# Patient Record
Sex: MALE | Race: WHITE
[De-identification: names, ages, dates, MRNs, and addresses within clinical notes are randomized per-mention and may not be internally consistent; named-entity substitution may affect disease eponyms.]

---

## 2017-03-06 ENCOUNTER — MYAURORA ACCOUNT LINK (OUTPATIENT)
Dept: OTHER | Age: 82
End: 2017-03-06

## 2017-03-06 ENCOUNTER — PRIOR ORIGINAL RECORDS (OUTPATIENT)
Dept: OTHER | Age: 82
End: 2017-03-06

## 2017-03-17 ENCOUNTER — TELEPHONE (OUTPATIENT)
Dept: HEMATOLOGY/ONCOLOGY | Facility: HOSPITAL | Age: 82
End: 2017-03-17

## 2017-03-17 NOTE — TELEPHONE ENCOUNTER
LM with patient reminding him to have his labs done prior to his follow up with Dr Randall Rockwell which is scheduled for 3/20.

## 2017-03-22 ENCOUNTER — OFFICE VISIT (OUTPATIENT)
Dept: HEMATOLOGY/ONCOLOGY | Facility: HOSPITAL | Age: 82
End: 2017-03-22
Attending: INTERNAL MEDICINE
Payer: MEDICARE

## 2017-03-22 VITALS
SYSTOLIC BLOOD PRESSURE: 141 MMHG | HEART RATE: 60 BPM | TEMPERATURE: 98 F | BODY MASS INDEX: 26.37 KG/M2 | DIASTOLIC BLOOD PRESSURE: 86 MMHG | HEIGHT: 67 IN | WEIGHT: 168 LBS | RESPIRATION RATE: 18 BRPM

## 2017-03-22 DIAGNOSIS — I25.5 ISCHEMIC CARDIOMYOPATHY: ICD-10-CM

## 2017-03-22 DIAGNOSIS — D64.9 ANEMIA, UNSPECIFIED TYPE: ICD-10-CM

## 2017-03-22 DIAGNOSIS — D69.6 THROMBOCYTOPENIA (HCC): Primary | ICD-10-CM

## 2017-03-22 LAB
BASOPHILS # BLD: 0 K/UL (ref 0–0.2)
BASOPHILS NFR BLD: 0 %
EOSINOPHIL # BLD: 0.1 K/UL (ref 0–0.7)
EOSINOPHIL NFR BLD: 1 %
ERYTHROCYTE [DISTWIDTH] IN BLOOD BY AUTOMATED COUNT: 14.3 % (ref 11–15)
HCT VFR BLD AUTO: 36.6 % (ref 41–52)
HGB BLD-MCNC: 12.5 G/DL (ref 13.5–17.5)
LDH SERPL L TO P-CCNC: 241 U/L (ref 98–192)
LYMPHOCYTES # BLD: 1 K/UL (ref 1–4)
LYMPHOCYTES NFR BLD: 16 %
MCH RBC QN AUTO: 30.8 PG (ref 27–32)
MCHC RBC AUTO-ENTMCNC: 34.1 G/DL (ref 32–37)
MCV RBC AUTO: 90.3 FL (ref 80–100)
MONOCYTES # BLD: 0.6 K/UL (ref 0–1)
MONOCYTES NFR BLD: 9 %
NEUTROPHILS # BLD AUTO: 4.5 K/UL (ref 1.8–7.7)
NEUTROPHILS NFR BLD: 73 %
PLATELET # BLD AUTO: 99 K/UL (ref 140–400)
PMV BLD AUTO: 10.4 FL (ref 7.4–10.3)
RBC # BLD AUTO: 4.05 M/UL (ref 4.5–5.9)
WBC # BLD AUTO: 6.1 K/UL (ref 4–11)

## 2017-03-22 PROCEDURE — G0463 HOSPITAL OUTPT CLINIC VISIT: HCPCS | Performed by: INTERNAL MEDICINE

## 2017-03-22 PROCEDURE — 99214 OFFICE O/P EST MOD 30 MIN: CPT | Performed by: INTERNAL MEDICINE

## 2017-03-22 NOTE — PROGRESS NOTES
Cancer Center Progress Note    Patient Name: Armando Christiansen   YOB: 1924   Medical Record Number: [id number]   Attending Physician: Kala Link M.D.        Chief Complaint:  thrombocytopenia    History of Present Illness:  80year-old male w tablet by mouth daily. , Disp: , Rfl:   •  carvedilol 6.25 MG Oral Tab, Take 6.25 mg by mouth 2 (two) times daily with meals.   , Disp: , Rfl: 0  •  simvastatin 80 MG Oral Tab, Take 40 mg by mouth nightly.  , Disp: , Rfl: 0  •  tamsulosin HCl 0.4 MG Oral Cap RDW 13.8 11/14/2016   WBC 6.1 11/14/2016   PLT 81* 11/14/2016     CMP: No results for input(s): GLU, BUN, CREATSERUM, GFRAA, GFRNAA, CA, ALB, NA, K, CL, CO2, ALKPHO, AST, ALT, BILT, TP in the last 72 hours.            Radiology:  none    No matching stagi

## 2017-03-23 ENCOUNTER — APPOINTMENT (OUTPATIENT)
Dept: HEMATOLOGY/ONCOLOGY | Facility: HOSPITAL | Age: 82
End: 2017-03-23
Attending: INTERNAL MEDICINE
Payer: MEDICARE

## 2017-04-04 ENCOUNTER — PRIOR ORIGINAL RECORDS (OUTPATIENT)
Dept: OTHER | Age: 82
End: 2017-04-04

## 2017-04-04 ENCOUNTER — LAB REQUISITION (OUTPATIENT)
Dept: LAB | Facility: HOSPITAL | Age: 82
End: 2017-04-04
Payer: MEDICARE

## 2017-04-04 DIAGNOSIS — N18.30 CHRONIC KIDNEY DISEASE, STAGE III (MODERATE) (HCC): ICD-10-CM

## 2017-04-04 DIAGNOSIS — E78.00 PURE HYPERCHOLESTEROLEMIA: ICD-10-CM

## 2017-04-04 DIAGNOSIS — E11.9 TYPE 2 DIABETES MELLITUS WITHOUT COMPLICATIONS (HCC): ICD-10-CM

## 2017-04-04 DIAGNOSIS — D69.6 THROMBOCYTOPENIA (HCC): ICD-10-CM

## 2017-04-04 PROCEDURE — 84460 ALANINE AMINO (ALT) (SGPT): CPT | Performed by: INTERNAL MEDICINE

## 2017-04-04 PROCEDURE — 80061 LIPID PANEL: CPT | Performed by: INTERNAL MEDICINE

## 2017-04-04 PROCEDURE — 80048 BASIC METABOLIC PNL TOTAL CA: CPT | Performed by: INTERNAL MEDICINE

## 2017-04-04 PROCEDURE — 83036 HEMOGLOBIN GLYCOSYLATED A1C: CPT | Performed by: INTERNAL MEDICINE

## 2017-04-04 PROCEDURE — 85025 COMPLETE CBC W/AUTO DIFF WBC: CPT | Performed by: INTERNAL MEDICINE

## 2017-06-16 LAB
ALT (SGPT): 18 U/L
BUN: 21 MG/DL
CALCIUM: 9.4 MG/DL
CHLORIDE: 108 MEQ/L
CHOLESTEROL, TOTAL: 152 MG/DL
CREATININE, SERUM: 1.23 MG/DL
GLUCOSE: 129 MG/DL
GLUCOSE: 129 MG/DL
HDL CHOLESTEROL: 41 MG/DL
HEMATOCRIT: 37.6 %
HEMOGLOBIN: 12.7 G/DL
LDL CHOLESTEROL: 95 MG/DL
MCH: 30.9 PG
MCHC: 33.8 G/DL
MCV: 91.5 FL
NON-HDL CHOLESTEROL: 111 MG/DL
PLATELETS: 104 K/UL
POTASSIUM, SERUM: 4.7 MEQ/L
RED BLOOD COUNT: 4.11 X 10-6/U
SGPT (ALT): 18 IU/L
SODIUM: 142 MEQ/L
TRIGLYCERIDES: 80 MG/DL
WHITE BLOOD COUNT: 5.7 X 10-3/U

## 2017-06-20 ENCOUNTER — MYAURORA ACCOUNT LINK (OUTPATIENT)
Dept: OTHER | Age: 82
End: 2017-06-20

## 2017-07-28 ENCOUNTER — PRIOR ORIGINAL RECORDS (OUTPATIENT)
Dept: OTHER | Age: 82
End: 2017-07-28

## 2017-09-19 ENCOUNTER — NURSE ONLY (OUTPATIENT)
Dept: HEMATOLOGY/ONCOLOGY | Facility: HOSPITAL | Age: 82
End: 2017-09-19
Attending: INTERNAL MEDICINE
Payer: MEDICARE

## 2017-09-19 ENCOUNTER — TELEPHONE (OUTPATIENT)
Dept: HEMATOLOGY/ONCOLOGY | Facility: HOSPITAL | Age: 82
End: 2017-09-19

## 2017-09-19 VITALS
SYSTOLIC BLOOD PRESSURE: 147 MMHG | TEMPERATURE: 98 F | HEART RATE: 60 BPM | DIASTOLIC BLOOD PRESSURE: 52 MMHG | BODY MASS INDEX: 25.11 KG/M2 | RESPIRATION RATE: 18 BRPM | WEIGHT: 160 LBS | HEIGHT: 67 IN

## 2017-09-19 DIAGNOSIS — D69.6 THROMBOCYTOPENIA (HCC): Primary | ICD-10-CM

## 2017-09-19 DIAGNOSIS — D69.6 THROMBOCYTOPENIA (HCC): ICD-10-CM

## 2017-09-19 DIAGNOSIS — D64.9 ANEMIA, UNSPECIFIED TYPE: ICD-10-CM

## 2017-09-19 LAB
BASOPHILS # BLD: 0 K/UL (ref 0–0.2)
BASOPHILS NFR BLD: 1 %
EOSINOPHIL # BLD: 0.1 K/UL (ref 0–0.7)
EOSINOPHIL NFR BLD: 2 %
ERYTHROCYTE [DISTWIDTH] IN BLOOD BY AUTOMATED COUNT: 14.2 % (ref 11–15)
HCT VFR BLD AUTO: 38.4 % (ref 41–52)
HGB BLD-MCNC: 13.3 G/DL (ref 13.5–17.5)
LYMPHOCYTES # BLD: 1 K/UL (ref 1–4)
LYMPHOCYTES NFR BLD: 17 %
MCH RBC QN AUTO: 31.4 PG (ref 27–32)
MCHC RBC AUTO-ENTMCNC: 34.7 G/DL (ref 32–37)
MCV RBC AUTO: 90.5 FL (ref 80–100)
MONOCYTES # BLD: 0.6 K/UL (ref 0–1)
MONOCYTES NFR BLD: 10 %
NEUTROPHILS # BLD AUTO: 3.9 K/UL (ref 1.8–7.7)
NEUTROPHILS NFR BLD: 70 %
PLATELET # BLD AUTO: 97 K/UL (ref 140–400)
PMV BLD AUTO: 10.1 FL (ref 7.4–10.3)
RBC # BLD AUTO: 4.24 M/UL (ref 4.5–5.9)
WBC # BLD AUTO: 5.6 K/UL (ref 4–11)

## 2017-09-19 PROCEDURE — 85025 COMPLETE CBC W/AUTO DIFF WBC: CPT

## 2017-09-19 PROCEDURE — 99213 OFFICE O/P EST LOW 20 MIN: CPT | Performed by: INTERNAL MEDICINE

## 2017-09-19 PROCEDURE — G0463 HOSPITAL OUTPT CLINIC VISIT: HCPCS | Performed by: INTERNAL MEDICINE

## 2017-09-19 PROCEDURE — 36415 COLL VENOUS BLD VENIPUNCTURE: CPT

## 2017-09-19 NOTE — PROGRESS NOTES
patient to center for fast track labs prior to MD visit  Labs drawn peripherally from RT FA 2 x 2 and coban to site  Patient escorted to waiting area, he will see Dr. Cresencio Méndez today  , patient states he did not eat breakfast, he denies any dizziness juice and

## 2017-09-19 NOTE — PROGRESS NOTES
Cancer Center Progress Note    Patient Name: Zachary Kendrick   YOB: 1924   Medical Record Number: [id number]   Attending Physician: Terrence Tovar M.D.        Chief Complaint:  thrombocytopenia    History of Present Illness:  80year-old male w •  multivitamin Oral Tab, Take 1 tablet by mouth daily. , Disp: , Rfl:   •  carvedilol 6.25 MG Oral Tab, Take 6.25 mg by mouth 2 (two) times daily with meals.   , Disp: , Rfl: 0  •  simvastatin 80 MG Oral Tab, Take 40 mg by mouth nightly.  , Disp: , Rfl: 0 11/14/2016   MCV 91.4 11/14/2016   MCH 31.3 11/14/2016   MCHC 34.3 11/14/2016   RDW 13.8 11/14/2016   WBC 6.1 11/14/2016   PLT 81* 11/14/2016     CMP: No results for input(s): GLU, BUN, CREATSERUM, GFRAA, GFRNAA, CA, ALB, NA, K, CL, CO2, ALKPHO, AST, ALT,

## 2017-10-10 ENCOUNTER — LAB REQUISITION (OUTPATIENT)
Dept: LAB | Facility: HOSPITAL | Age: 82
End: 2017-10-10
Payer: MEDICARE

## 2017-10-10 DIAGNOSIS — E11.9 TYPE 2 DIABETES MELLITUS WITHOUT COMPLICATIONS (HCC): ICD-10-CM

## 2017-10-10 DIAGNOSIS — E78.00 PURE HYPERCHOLESTEROLEMIA: ICD-10-CM

## 2017-10-10 PROCEDURE — 80053 COMPREHEN METABOLIC PANEL: CPT | Performed by: INTERNAL MEDICINE

## 2017-10-10 PROCEDURE — 84443 ASSAY THYROID STIM HORMONE: CPT | Performed by: INTERNAL MEDICINE

## 2017-10-10 PROCEDURE — 85025 COMPLETE CBC W/AUTO DIFF WBC: CPT | Performed by: INTERNAL MEDICINE

## 2017-10-10 PROCEDURE — 83036 HEMOGLOBIN GLYCOSYLATED A1C: CPT | Performed by: INTERNAL MEDICINE

## 2017-10-10 PROCEDURE — 80061 LIPID PANEL: CPT | Performed by: INTERNAL MEDICINE

## 2017-10-13 ENCOUNTER — LAB REQUISITION (OUTPATIENT)
Dept: LAB | Facility: HOSPITAL | Age: 82
End: 2017-10-13
Payer: MEDICARE

## 2017-10-13 DIAGNOSIS — N18.30 CHRONIC KIDNEY DISEASE, STAGE III (MODERATE) (HCC): ICD-10-CM

## 2017-10-13 DIAGNOSIS — R41.3 OTHER AMNESIA: ICD-10-CM

## 2017-10-13 PROCEDURE — 82043 UR ALBUMIN QUANTITATIVE: CPT | Performed by: INTERNAL MEDICINE

## 2017-10-13 PROCEDURE — 82607 VITAMIN B-12: CPT | Performed by: INTERNAL MEDICINE

## 2017-10-13 PROCEDURE — 82570 ASSAY OF URINE CREATININE: CPT | Performed by: INTERNAL MEDICINE

## 2017-10-13 PROCEDURE — 81003 URINALYSIS AUTO W/O SCOPE: CPT | Performed by: INTERNAL MEDICINE

## 2017-11-14 ENCOUNTER — HOSPITAL ENCOUNTER (EMERGENCY)
Facility: HOSPITAL | Age: 82
Discharge: HOME | End: 2017-11-14
Attending: EMERGENCY MEDICINE
Payer: MEDICARE

## 2017-11-14 VITALS
TEMPERATURE: 98 F | SYSTOLIC BLOOD PRESSURE: 155 MMHG | OXYGEN SATURATION: 97 % | HEART RATE: 60 BPM | RESPIRATION RATE: 13 BRPM | DIASTOLIC BLOOD PRESSURE: 60 MMHG

## 2017-11-14 DIAGNOSIS — R42 DIZZINESS: Primary | ICD-10-CM

## 2017-11-14 PROCEDURE — 80048 BASIC METABOLIC PNL TOTAL CA: CPT | Performed by: EMERGENCY MEDICINE

## 2017-11-14 PROCEDURE — 85025 COMPLETE CBC W/AUTO DIFF WBC: CPT | Performed by: EMERGENCY MEDICINE

## 2017-11-14 PROCEDURE — 93010 ELECTROCARDIOGRAM REPORT: CPT | Performed by: EMERGENCY MEDICINE

## 2017-11-14 PROCEDURE — 93005 ELECTROCARDIOGRAM TRACING: CPT

## 2017-11-14 PROCEDURE — 36415 COLL VENOUS BLD VENIPUNCTURE: CPT

## 2017-11-14 PROCEDURE — 99284 EMERGENCY DEPT VISIT MOD MDM: CPT

## 2017-11-14 NOTE — ED NOTES
Spoke to VERENICE, arranged wheelchair transport home via [employer]. ETA 60-90 minutes.

## 2017-11-14 NOTE — ED NOTES
Food tray delivered. Pt eating dinner sitting on the edge of the bed. No signs or symptoms of distress.

## 2017-11-15 NOTE — ED PROVIDER NOTES
Patient Seen in: [facility] AND [facility] Emergency Department    History   Patient presents with:  Dizziness (neurologic)      HPI    The patient presents via EMS after feeling dizzy while standing up at his care facility earlier today.   Had slight nausea foll Constitutional: He is oriented to person, place, and time. He appears well-developed and well-nourished. No distress. HENT:   Head: Normocephalic and atraumatic. Eyes: Conjunctivae are normal. Right eye exhibits no discharge.  Left eye exhibits no dis while in ED:     ED Medications Administered: Medications - No data to display      [facility]      11/14/17  1526 11/14/17  1600   BP: (!) 162/59 155/60   Pulse: 60 60   Resp: 17 13   Temp: 98.3 °F (36.8 °C)    TempSrc: Temporal    SpO2: 99% 97%     *I personall

## 2017-11-21 ENCOUNTER — MYAURORA ACCOUNT LINK (OUTPATIENT)
Dept: OTHER | Age: 82
End: 2017-11-21

## 2017-11-28 ENCOUNTER — HOSPITAL ENCOUNTER (OUTPATIENT)
Facility: HOSPITAL | Age: 82
Setting detail: OBSERVATION
LOS: 1 days | Discharge: NURSING, REHAB OR LONG-TERM CARE | End: 2017-11-30
Attending: EMERGENCY MEDICINE | Admitting: INTERNAL MEDICINE
Payer: MEDICARE

## 2017-11-28 ENCOUNTER — PRIOR ORIGINAL RECORDS (OUTPATIENT)
Dept: OTHER | Age: 82
End: 2017-11-28

## 2017-11-28 ENCOUNTER — APPOINTMENT (OUTPATIENT)
Dept: GENERAL RADIOLOGY | Facility: HOSPITAL | Age: 82
End: 2017-11-28
Attending: EMERGENCY MEDICINE
Payer: MEDICARE

## 2017-11-28 ENCOUNTER — APPOINTMENT (OUTPATIENT)
Dept: CT IMAGING | Facility: HOSPITAL | Age: 82
End: 2017-11-28
Attending: EMERGENCY MEDICINE
Payer: MEDICARE

## 2017-11-28 DIAGNOSIS — S22.41XA CLOSED FRACTURE OF MULTIPLE RIBS OF RIGHT SIDE, INITIAL ENCOUNTER: Primary | ICD-10-CM

## 2017-11-28 DIAGNOSIS — R07.9 ACUTE CHEST PAIN: ICD-10-CM

## 2017-11-28 DIAGNOSIS — E86.0 DEHYDRATION: ICD-10-CM

## 2017-11-28 DIAGNOSIS — R41.0 DELIRIUM: ICD-10-CM

## 2017-11-28 PROBLEM — R73.9 HYPERGLYCEMIA: Status: ACTIVE | Noted: 2017-11-28

## 2017-11-28 PROCEDURE — 70450 CT HEAD/BRAIN W/O DYE: CPT | Performed by: EMERGENCY MEDICINE

## 2017-11-28 PROCEDURE — 71101 X-RAY EXAM UNILAT RIBS/CHEST: CPT | Performed by: EMERGENCY MEDICINE

## 2017-11-28 RX ORDER — TIMOLOL MALEATE 5 MG/ML
1 SOLUTION OPHTHALMIC DAILY
Status: DISCONTINUED | OUTPATIENT
Start: 2017-11-28 | End: 2017-11-29

## 2017-11-28 RX ORDER — WARFARIN SODIUM 5 MG/1
5 TABLET ORAL NIGHTLY
Status: DISCONTINUED | OUTPATIENT
Start: 2017-11-28 | End: 2017-11-30

## 2017-11-28 RX ORDER — CARVEDILOL 6.25 MG/1
6.25 TABLET ORAL 2 TIMES DAILY WITH MEALS
Status: DISCONTINUED | OUTPATIENT
Start: 2017-11-28 | End: 2017-11-30

## 2017-11-28 RX ORDER — DONEPEZIL HYDROCHLORIDE 5 MG/1
5 TABLET, FILM COATED ORAL NIGHTLY
COMMUNITY

## 2017-11-28 RX ORDER — POLYETHYLENE GLYCOL 3350 17 G/17G
17 POWDER, FOR SOLUTION ORAL DAILY PRN
Status: DISCONTINUED | OUTPATIENT
Start: 2017-11-28 | End: 2017-11-30

## 2017-11-28 RX ORDER — ALFUZOSIN HYDROCHLORIDE 10 MG/1
10 TABLET, EXTENDED RELEASE ORAL
Status: DISCONTINUED | OUTPATIENT
Start: 2017-11-29 | End: 2017-11-30

## 2017-11-28 RX ORDER — SODIUM CHLORIDE 9 MG/ML
INJECTION, SOLUTION INTRAVENOUS CONTINUOUS
Status: ACTIVE | OUTPATIENT
Start: 2017-11-28 | End: 2017-11-28

## 2017-11-28 RX ORDER — PRAVASTATIN SODIUM 20 MG
20 TABLET ORAL NIGHTLY
Status: DISCONTINUED | OUTPATIENT
Start: 2017-11-28 | End: 2017-11-30

## 2017-11-28 RX ORDER — BISACODYL 10 MG
10 SUPPOSITORY, RECTAL RECTAL
Status: DISCONTINUED | OUTPATIENT
Start: 2017-11-28 | End: 2017-11-30

## 2017-11-28 RX ORDER — DEXTROSE MONOHYDRATE 25 G/50ML
50 INJECTION, SOLUTION INTRAVENOUS AS NEEDED
Status: DISCONTINUED | OUTPATIENT
Start: 2017-11-28 | End: 2017-11-30

## 2017-11-28 RX ORDER — SODIUM CHLORIDE 0.9 % (FLUSH) 0.9 %
3 SYRINGE (ML) INJECTION AS NEEDED
Status: DISCONTINUED | OUTPATIENT
Start: 2017-11-28 | End: 2017-11-30

## 2017-11-28 RX ORDER — ONDANSETRON 2 MG/ML
4 INJECTION INTRAMUSCULAR; INTRAVENOUS EVERY 6 HOURS PRN
Status: DISCONTINUED | OUTPATIENT
Start: 2017-11-28 | End: 2017-11-30

## 2017-11-28 RX ORDER — ASPIRIN 81 MG/1
81 TABLET, CHEWABLE ORAL DAILY
Status: DISCONTINUED | OUTPATIENT
Start: 2017-11-29 | End: 2017-11-30

## 2017-11-28 RX ORDER — HEPARIN SODIUM 5000 [USP'U]/ML
5000 INJECTION, SOLUTION INTRAVENOUS; SUBCUTANEOUS EVERY 12 HOURS SCHEDULED
Status: DISCONTINUED | OUTPATIENT
Start: 2017-11-28 | End: 2017-11-30

## 2017-11-28 RX ORDER — DOCUSATE SODIUM 100 MG/1
100 CAPSULE, LIQUID FILLED ORAL 2 TIMES DAILY
Status: DISCONTINUED | OUTPATIENT
Start: 2017-11-28 | End: 2017-11-30

## 2017-11-28 RX ORDER — DONEPEZIL HYDROCHLORIDE 5 MG/1
5 TABLET, FILM COATED ORAL NIGHTLY
Status: DISCONTINUED | OUTPATIENT
Start: 2017-11-28 | End: 2017-11-30

## 2017-11-28 RX ORDER — SODIUM PHOSPHATE, DIBASIC AND SODIUM PHOSPHATE, MONOBASIC 7; 19 G/133ML; G/133ML
1 ENEMA RECTAL ONCE AS NEEDED
Status: DISCONTINUED | OUTPATIENT
Start: 2017-11-28 | End: 2017-11-30

## 2017-11-28 RX ORDER — DOXEPIN HYDROCHLORIDE 50 MG/1
1 CAPSULE ORAL DAILY
Status: DISCONTINUED | OUTPATIENT
Start: 2017-11-29 | End: 2017-11-30

## 2017-11-28 RX ORDER — LOSARTAN POTASSIUM 50 MG/1
50 TABLET ORAL DAILY
Status: DISCONTINUED | OUTPATIENT
Start: 2017-11-29 | End: 2017-11-30

## 2017-11-28 NOTE — ED NOTES
Patient taken off bed pan, walked to bathroom, states he had to pee, did not pee or have BM, ambulated to bed with assistance

## 2017-11-28 NOTE — ED INITIAL ASSESSMENT (HPI)
Per ems patient fell today, witness by nurse at [facility], states patient has been more confused \"lately\", patient states the fall was yesterday, complains of chest and back pain, denies head/neck pain/loc, a&ox3

## 2017-11-29 ENCOUNTER — APPOINTMENT (OUTPATIENT)
Dept: GENERAL RADIOLOGY | Facility: HOSPITAL | Age: 82
End: 2017-11-29
Attending: INTERNAL MEDICINE
Payer: MEDICARE

## 2017-11-29 PROCEDURE — 71020 XR CHEST PA + LAT CHEST (CPT=71020): CPT | Performed by: INTERNAL MEDICINE

## 2017-11-29 PROCEDURE — 72080 X-RAY EXAM THORACOLMB 2/> VW: CPT | Performed by: INTERNAL MEDICINE

## 2017-11-29 PROCEDURE — 99204 OFFICE O/P NEW MOD 45 MIN: CPT | Performed by: OTHER

## 2017-11-29 RX ORDER — TIMOLOL MALEATE 5 MG/ML
1 SOLUTION/ DROPS OPHTHALMIC 2 TIMES DAILY
Status: DISCONTINUED | OUTPATIENT
Start: 2017-11-29 | End: 2017-11-30

## 2017-11-29 NOTE — CONSULTS
[facility] HOSP - [facility]    Report of Consultation    Efrain Blue Grass Patient Status:  Inpatient    3/22/1924 MRN [id number]   Location [facility] 3W/SW Attending Livia Marcial MD   Hosp Day # 1 PCP Crystal Colin MD     Date of Admission: mg Oral Daily   multivitamin (ADULT) tab 1 tablet 1 tablet Oral Daily   Pravastatin Sodium (PRAVACHOL) tab 20 mg 20 mg Oral Nightly   Alfuzosin HCl ER (UROXATRAL) 24 hr tab 10 mg 10 mg Oral Daily with breakfast   Warfarin Sodium (COUMADIN) tab 5 mg 5 mg Or Losartan Potassium (COZAAR) 50 MG Oral Tab Take 1 tablet by mouth daily.        Allergies  No Known Allergies    Review of Systems:   As in HPI, the rest of the 14 system review was done and was negative    Physical Exam:      11/28/17 1958 11/29/17  050 sign    Coordination:  Finger to nose intact  Rapid alternating movements intact    Gait:  Unable to test    Results:     Laboratory Data:    Lab Results  Component Value Date   WBC 10.2 11/28/2017   HGB 13.0 (L) 11/28/2017   HCT 38.0 (L) 11/28/2017   PLT

## 2017-11-29 NOTE — CONSULTS
[facility] - [facility]    Report of Consultation    Emelina Tierney Patient Status:  Inpatient    3/22/1924 MRN [id number]   Location [facility] 3W/SW Attending Danette Crump MD   Hosp Day # 1 PCP Candis Mccurdy MD     Date of Admission: carvedilol 6.25 MG Oral Tab Take 6.25 mg by mouth 2 (two) times daily with meals. simvastatin 80 MG Oral Tab Take 40 mg by mouth nightly. tamsulosin HCl 0.4 MG Oral Cap Take 0.4 mg by mouth daily.      Warfarin Sodium 5 MG Oral Tab Take 5 mg by mo 11/28/2017   B12 560 10/13/2017     Xr Chest Pa + Lat Chest (pin=[id number])    Result Date: 11/29/2017  CONCLUSION:  1. Cardiomegaly. Postop cardiac surgery changes. 2. Vascular congestion with mild diffuse interstitial edema.  Findings consistent with acute ex anticoagulation  Coumadin has been restarted by PCP  Awaiting physical therapy evaluation to determine safety of gait and fall risk  Interrogation of ICD to evaluate frequency of atrial fibrillation and to r/o any recent events     Hyperglycemia  Her PCP

## 2017-11-29 NOTE — HISTORICAL OFFICE NOTE
Equilla Smoker  : 1924  ACCOUNT:  [id number]  [phone]  PCP: Dr. Conor Paulino    TODAY'S DATE: 2017  DICTATED BY:  Jonathan Cali M.D.]    CHIEF COMPLAINT: [Followup of Hypercholesteremia, pure, Followup of ICD, not pacemaker dependent, Asya Amyck CAFFEINE: rare. ALCOHOL: socially. EXERCISE: walks. DIET: no special diet. MARITAL STATUS: . OCCUPATION: dentist and retired.     ALLERGIES: No Known Allergies    MEDICATIONS: Selected prescriptions see below    VITAL SIGNS: [B/P - 138/62 , Pulse - 6 80MG      1/2 tab at bedtime  12/19/16 Vitamin D3            2000UNIT  1cap 3-4 times a week  12/19/16 Vitamin E             400UNIT   1cap a week  12/03/15 Glucosamine 1500 Compl          1 daily  12/03/15 Hydrochlorothiazide   25MG      1/2 tab daily

## 2017-11-29 NOTE — H&P
[facility]    PATIENT'S NAME: Uzma Elias   ATTENDING PHYSICIAN: Dru Mckeon MD   PATIENT ACCOUNT#:   [id number]    LOCATION:  [address] [place] [address] #:   [id number]       YOB: 1924  ADMISSION DATE:       11/28/2 Timoptic 0.5% solution once a day; vitamin D 3000 international units 3 to 4 times a week;  Flomax 0.4 mg a day; aspirin 81 mg a day; Coreg 6.25 mg twice a day; Aricept 5 mg at night; losartan 50 mg a day; multivitamin 1 a day; Zocor 40 mg at bedtime; warf Reveals a midline sternotomy scar. ABDOMEN:  Flat, positive bowel sounds, soft, nontender without organomegaly. EXTREMITIES:  Without cyanosis, clubbing, or edema.   Pulses are decreased significantly in the lower extremities, although the feet are warm a

## 2017-11-29 NOTE — ED PROVIDER NOTES
Patient Seen in: [facility] AND [facility] Emergency Department    History   Patient presents with:  Fall (musculoskeletal, neurologic)    Stated Complaint: fall    HPI    The patient is a 49-year-old male who presents the emergency department after falling at h Current:/61   Pulse 62   Temp 98 °F (36.7 °C)   Resp 18   Ht 172.7 cm (5' 8\")   Wt 73 kg   SpO2 98%   BMI 24.47 kg/m²         Physical Exam   Constitutional: He is oriented to person, place, and time. He appears well-developed and well-nourished. All other components within normal limits   TROPONIN I, 0 HOUR - Abnormal; Notable for the following:     Troponin 0.05 (*)     All other components within normal limits   LIPID PANEL - Abnormal; Notable for the following:     Non HDL Chol 149 (*)     LDL , sinus, normal     Radiology findings: Ct Brain Or Head (87124)    Result Date: 11/28/2017  CONCLUSION: Normal examination for a patient of this age.           Xr Ribs With Chest (3 Views), Right  (cpt=71101)    Result Date: 11/28/2017  CONCLUSION:  1. Non

## 2017-11-29 NOTE — PROGRESS NOTES
timolol xe (TIMOPTIC-XR) gel-forming ophthalmic solution 0.5% daily is Non-Formulary Medication &  Auto-Substituted to Timolol 0.5% 1 drop bid Per P&T PROTOCOL

## 2017-11-30 ENCOUNTER — APPOINTMENT (OUTPATIENT)
Dept: CV DIAGNOSTICS | Facility: HOSPITAL | Age: 82
End: 2017-11-30
Attending: INTERNAL MEDICINE
Payer: MEDICARE

## 2017-11-30 VITALS
WEIGHT: 147.69 LBS | HEIGHT: 68 IN | RESPIRATION RATE: 18 BRPM | SYSTOLIC BLOOD PRESSURE: 136 MMHG | DIASTOLIC BLOOD PRESSURE: 57 MMHG | TEMPERATURE: 98 F | OXYGEN SATURATION: 97 % | BODY MASS INDEX: 22.38 KG/M2 | HEART RATE: 60 BPM

## 2017-11-30 PROCEDURE — 93306 TTE W/DOPPLER COMPLETE: CPT | Performed by: INTERNAL MEDICINE

## 2017-11-30 RX ORDER — ASPIRIN 325 MG
325 TABLET, DELAYED RELEASE (ENTERIC COATED) ORAL DAILY
Status: DISCONTINUED | OUTPATIENT
Start: 2017-12-01 | End: 2017-11-30

## 2017-11-30 RX ORDER — ASPIRIN 81 MG/1
243 TABLET ORAL ONCE
Status: COMPLETED | OUTPATIENT
Start: 2017-11-30 | End: 2017-11-30

## 2017-11-30 NOTE — PLAN OF CARE
[facility]ALT  EVAL AND 2D ECHO TODAY, PLAN TO TRANSFER TO [facility] REHAB THIS EVENING

## 2017-11-30 NOTE — PLAN OF CARE
Rested through the night with no complaints, vss, did not want prn pain medication, patient with reddened bottom, barrier cream applied and mepilex placed. Encouraged to reposition.  When asked to use a pillow underneath to help keep off bottom, patient ref

## 2017-11-30 NOTE — CM/SW NOTE
11/303/17 CM Discharge planning   Spoke with Jonh Smith at [facility],  [address] has a 60 day life care contract and will be able to go to [facility] for rehab under his contract benefit and Medicare part B for therapy.     Referral and medical records faxed

## 2017-11-30 NOTE — PLAN OF CARE
XRAYS TODAY, SW FOR D/C PLANNING, MAY NEED ASSISTED LIVING, 2D ECHO ORDERED, P.T. EVAL ORDERED, PM INTERROGATION TOMORROW,

## 2017-11-30 NOTE — PHYSICAL THERAPY NOTE
PHYSICAL THERAPY EVALUATION - INPATIENT     Room Number: 329/329-A  Evaluation Date: 11/30/2017  Type of Evaluation: Initial  Physician Order: PT Eval and Treat    Presenting Problem:  (S/P fall, Fractures of Multiple Ribs)  Reason for Therapy: Eliana Ojeda HHPT/24hour supe)    PLAN  PT Treatment Plan: Bed mobility; Body mechanics; Endurance; Patient education;Gait training;Strengthening;Transfer training;Balance training  Rehab Potential : Good  Frequency (Obs): 5x/week       PHYSICAL THERAPY MEDICAL/SOCIAL HIS (Lives at [facility]Assisted living per RN)   Home Layout: One level                   Drives: No  Patient Owned Equipment:  (Pt can't recall if he uses RW or cane or not)  Patient Regularly Uses:  (Per pt, he was ambulatory)    Prior Level of Independ PT Approx Degree of Impairment Score: 46.58%   Standardized Score (AM-PAC Scale): 43.63   CMS Modifier (G-Code): CK    FUNCTIONAL ABILITY STATUS  Gait Assessment   Gait Assistance:  (CGAX1)  Distance (ft): 90ft  Assistive Device: Rolling walker  Pattern:

## 2017-11-30 NOTE — PROGRESS NOTES
[facility] HOSP - [facility]    Progress Note    Adin August Patient Status:  Observation    3/22/1924 MRN [id number]   Location [facility] 3W/SW Attending Kirstin Biswas MD   Hosp Day # 0 PCP Silvia Andujar MD       Subjective:   Jacinto Gutierrez Recent Labs   Lab  11/29/17   0959  11/29/17   1410  11/29/17   1841  11/29/17   2045   PGLU  142*  218*  174*  166*       Scheduled Meds:   • Timolol Maleate  1 drop Both Eyes BID   • aspirin  81 mg Oral Daily   • carvedilol  6.25 mg Oral BID with m -possibly demand type Pacemaker ECG, No further analysis INSUFFICIENT DATA When compared with ECG of 11/28/2017 13:43:59 No change Electronically signed on 11/28/2017 at 17:29 by Oriana Painter MD    Ekg 12-lead    Result Date: 11/28/2017  ECG Report  Inte

## 2017-11-30 NOTE — PROGRESS NOTES
[facility]    Cardiology Progress Note    Lyly Rodrigez Patient Status:  Observation    3/22/1924 MRN [id number]   Location [facility] 3W/SW Attending Pablo Powell MD   Hosp Day # 0 PCP Mlavin Grant MD     2017  Lukasz Lino 11/28/17   1356  11/28/17   1713  11/29/17   1551   TROP  0.05*  0.05*  0.06*       Allergies:   No Known Allergies    Medications:    Current Facility-Administered Medications:  MetFORMIN HCl (GLUCOPHAGE) tab 500 mg 500 mg Oral Daily with breakfast   Alvaro per medicine team     History of atrial fibrillation on anticoagulation  - interrogation performed no Afibb present no events. - High risk for falls multiple in the past recommend stopping coumadin due to high fall risk and continue ASA 325mg qday only.

## 2017-11-30 NOTE — DIETARY NOTE
ADULT NUTRITION INITIAL ASSESSMENT    Pt is at high nutrition risk. Pt meets malnutrition criteria.       CRITERIA FOR MALNUTRITION DIAGNOSIS:  Criteria for non-severe malnutrition diagnosis: chronic illness related to wt loss 7.5% in 3 months, energy [place] assess education needs    - Coordination of nutrition care: collaboration with other providers    - Discharge and transfer of nutrition care to new setting or provider: Pt from [facility] PTA, monitor plans    ADMITTING DIAGNOSIS:  s/p fall, supplement greater than 75% of needs, labs WNL and promote healing      DIETITIAN FOLLOW UP: RD to follow up within 5 days     Michelle Younger RD,LDN  [phone]

## 2017-12-01 ENCOUNTER — SNF VISIT (OUTPATIENT)
Dept: INTERNAL MEDICINE CLINIC | Facility: SKILLED NURSING FACILITY | Age: 82
End: 2017-12-01

## 2017-12-01 VITALS
OXYGEN SATURATION: 96 % | WEIGHT: 150 LBS | DIASTOLIC BLOOD PRESSURE: 60 MMHG | SYSTOLIC BLOOD PRESSURE: 121 MMHG | HEART RATE: 59 BPM | TEMPERATURE: 97 F | BODY MASS INDEX: 23 KG/M2 | RESPIRATION RATE: 20 BRPM

## 2017-12-01 DIAGNOSIS — E86.0 DEHYDRATION: ICD-10-CM
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
## 2017-12-01 DIAGNOSIS — S22.41XS CLOSED FRACTURE OF MULTIPLE RIBS OF RIGHT SIDE, SEQUELA: ICD-10-CM

## 2017-12-01 DIAGNOSIS — S22.41XA CLOSED FRACTURE OF MULTIPLE RIBS OF RIGHT SIDE, INITIAL ENCOUNTER: ICD-10-CM

## 2017-12-01 LAB
BUN: 17 MG/DL
CALCIUM: 10 MG/DL
CHLORIDE: 104 MEQ/L
CHOLESTEROL, TOTAL: 188 MG/DL
CREATININE, SERUM: 1.23 MG/DL
GLUCOSE: 202 MG/DL
GLUCOSE: 202 MG/DL
HDL CHOLESTEROL: 39 MG/DL
HEMOGLOBIN A1C: 5.8 %
LDL CHOLESTEROL: 133 MG/DL
POTASSIUM, SERUM: 4 MEQ/L
SODIUM: 141 MEQ/L
TRIGLYCERIDES: 78 MG/DL

## 2017-12-01 PROCEDURE — 99309 SBSQ NF CARE MODERATE MDM 30: CPT | Performed by: NURSE PRACTITIONER

## 2017-12-01 NOTE — PROGRESS NOTES
Arturo Flatness  : 3/22/1924  Age 80year old  male patient is admitted to [place] [address] 17 for rehab and strengthening     Chief complaint:  Disorientation status post fall.     HPI  51-year-old  male with atherosclerotic heart disease, hypertens status: Former Smoker                                                              Packs/day: 0.00      Years: 0.00      Smokeless tobacco: Never Used                      Comment: Quit 40years ago  Alcohol use: Yes           0.6 oz/week     Glasses of win denies shortness of breath, wheezing or cough   CARDIOVASCULAR:denies chest pain, no palpitations   GI: denies nausea, vomiting, constipation, diarrhea; no rectal bleeding; no heartburn  :no dysuria, urgency or frequency; no epididymal or testicular pain risk, parameters to be in place   -monitor  3. Mild dementia.   - oriented x 3  -very pleasant and cooperative   -cont donepezil 5 mg po q day   4. Prediabetes.   -accuchecks bid  -cont metformin 500mg po daily  -dietician consult, to follow in

## 2017-12-05 ENCOUNTER — SNF VISIT (OUTPATIENT)
Dept: INTERNAL MEDICINE CLINIC | Facility: SKILLED NURSING FACILITY | Age: 82
End: 2017-12-05

## 2017-12-05 VITALS
DIASTOLIC BLOOD PRESSURE: 74 MMHG | RESPIRATION RATE: 20 BRPM | OXYGEN SATURATION: 96 % | WEIGHT: 180 LBS | BODY MASS INDEX: 27 KG/M2 | SYSTOLIC BLOOD PRESSURE: 160 MMHG | HEART RATE: 60 BPM | TEMPERATURE: 98 F
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
## 2017-12-05 DIAGNOSIS — R73.9 HYPERGLYCEMIA: ICD-10-CM

## 2017-12-05 DIAGNOSIS — R53.1 GENERALIZED WEAKNESS: ICD-10-CM

## 2017-12-05 DIAGNOSIS — S22.41XS CLOSED FRACTURE OF MULTIPLE RIBS OF RIGHT SIDE, SEQUELA: ICD-10-CM

## 2017-12-05 PROCEDURE — 99308 SBSQ NF CARE LOW MDM 20: CPT | Performed by: NURSE PRACTITIONER

## 2017-12-05 NOTE — PROGRESS NOTES
Dane Acosta  : 3/22/1924  Age 80year old  male patient is admitted to [facility] [place]  17 for rehab and strengthening     Admitted to [facility] AND [facility] on: 17 to 17    Chief complaint:Disorientation status post fall.  Generalized we STATUS:  Full Code    ADVANCED CARE PLANNING TEAM: will need family care plan, home 12/21/17 back to THE [facility], will need additional resources    CURRENT MEDICATIONS -reviewed and updated     Current Outpatient Prescriptions:  MetFORMIN HCl 500 MG Oral nocturia: no hematuria  MUSCULOSKELETAL:no joint complaints upper or lower extremities  NEURO:no sensory or motor complaint  PSYCHE: no symptoms of depression or anxiety  HEMATOLOGY:denies hx anemia  ENDOCRINE: denies excessive thirst or urination; denies rehab  -followed closely by PCP Dr. Brian Hicks. HTN- slightly elevated before meds, wnl after meds  -vs q shift  -cont carvedilol 6.5mg po bid  -cont hydrochlorothiazide 25mg q day  -cont cozaar 50mg po daily    6.       Paroxysmal atrial fibrillation.   Pa

## 2017-12-06 ENCOUNTER — SNF VISIT (OUTPATIENT)
Dept: INTERNAL MEDICINE CLINIC | Facility: SKILLED NURSING FACILITY | Age: 82
End: 2017-12-06

## 2017-12-06 VITALS
RESPIRATION RATE: 20 BRPM | WEIGHT: 150 LBS | BODY MASS INDEX: 23 KG/M2 | OXYGEN SATURATION: 95 % | TEMPERATURE: 97 F | HEART RATE: 62 BPM | DIASTOLIC BLOOD PRESSURE: 73 MMHG | SYSTOLIC BLOOD PRESSURE: 169 MMHG

## 2017-12-06 DIAGNOSIS — S22.41XS CLOSED FRACTURE OF MULTIPLE RIBS OF RIGHT SIDE, SEQUELA: ICD-10-CM

## 2017-12-06 DIAGNOSIS — R53.1 GENERALIZED WEAKNESS: ICD-10-CM
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
## 2017-12-06 PROCEDURE — 99308 SBSQ NF CARE LOW MDM 20: CPT | Performed by: NURSE PRACTITIONER

## 2017-12-06 NOTE — PROGRESS NOTES
Nestor Rao  : 3/22/1924  Age 80year old  male patient is admitted to [facility] [place] 17 for rehab and strengthening        Admitted to [facility] AND [facility] on:17 to 17    Chief complaint: Disorientation status post fall.  Generalized ALLERGIES:  No Known Allergies    CODE STATUS:  Full Code    ADVANCED CARE PLANNING TEAM: will need family care plan, home 12/21/17 back to THE [facility], will need additional resources    CURRENT MEDICATIONS -reviewed and updated    Current Outpatient pain; no penile discharge; no hernias; no nocturia: no hematuria  MUSCULOSKELETAL:no joint complaints upper or lower extremities  NEURO:no sensory or motor complaint  PSYCHE: no symptoms of depression or anxiety  HEMATOLOGY:denies hx anemia  ENDOCRINE: den 500mg po daily  -dietician consult, to follow in rehab  -followed closely by PCP Dr. Eliu Perry.  HTN- slightly elevated before meds, wnl after meds  -vs q shift  -cont carvedilol 6.5mg po bid  -cont hydrochlorothiazide 25mg q day  -cont cozaar 50mg po omayra

## 2017-12-07 ENCOUNTER — SNF VISIT (OUTPATIENT)
Dept: INTERNAL MEDICINE CLINIC | Facility: SKILLED NURSING FACILITY | Age: 82
End: 2017-12-07

## 2017-12-07 VITALS
WEIGHT: 157.19 LBS | BODY MASS INDEX: 24 KG/M2 | DIASTOLIC BLOOD PRESSURE: 75 MMHG | HEART RATE: 69 BPM | SYSTOLIC BLOOD PRESSURE: 160 MMHG | OXYGEN SATURATION: 6 % | TEMPERATURE: 98 F | RESPIRATION RATE: 20 BRPM

## 2017-12-07 DIAGNOSIS — R53.1 GENERALIZED WEAKNESS: ICD-10-CM

## 2017-12-07 DIAGNOSIS — S22.41XS CLOSED FRACTURE OF MULTIPLE RIBS OF RIGHT SIDE, SEQUELA: Primary | ICD-10-CM

## 2017-12-07 DIAGNOSIS — R73.9 HYPERGLYCEMIA: ICD-10-CM

## 2017-12-07 DIAGNOSIS — S22.41XA CLOSED FRACTURE OF MULTIPLE RIBS OF RIGHT SIDE, INITIAL ENCOUNTER: ICD-10-CM

## 2017-12-07 PROCEDURE — 99308 SBSQ NF CARE LOW MDM 20: CPT | Performed by: NURSE PRACTITIONER

## 2017-12-07 NOTE — PROGRESS NOTES
Areli Lynn  : 3/22/1924  Age 80year old  male patient is admitted to [facility] [place] 17 for rehab and strengthening        Admitted to [facility] AND [facility] on: 17 to 17    Chief complaint: Disorientation status post fall.  Generalized or bladder. No headaches or n/v. No new issues or concerns. Stable.     ALLERGIES:  No Known Allergies    CODE STATUS:  Full Code    ADVANCED CARE PLANNING TEAM: will need family care plan, home 12/21/17 back to THE [facility], will need additional resource rectal bleeding; no heartburn  :no dysuria, urgency or frequency; no epididymal or testicular pain; no penile discharge; no hernias; no nocturia: no hematuria  MUSCULOSKELETAL:no joint complaints upper or lower extremities  NEURO:no sensory or motor comp oriented x 2 to 3  -very pleasant and cooperative   -cont donepezil 5 mg po q day   4.       Prediabetes.   -accuchecks bid, 130's to 140's  -cont metformin 500mg po daily  -dietician consult, to follow in rehab  -followed closely by PCP Dr. Anthony Kelly    5. HTN

## 2017-12-11 ENCOUNTER — SNF VISIT (OUTPATIENT)
Dept: INTERNAL MEDICINE CLINIC | Facility: SKILLED NURSING FACILITY | Age: 82
End: 2017-12-11

## 2017-12-11 VITALS
OXYGEN SATURATION: 96 % | TEMPERATURE: 98 F | HEART RATE: 58 BPM | RESPIRATION RATE: 20 BRPM | WEIGHT: 157.19 LBS | BODY MASS INDEX: 24 KG/M2 | DIASTOLIC BLOOD PRESSURE: 72 MMHG | SYSTOLIC BLOOD PRESSURE: 132 MMHG

## 2017-12-11 DIAGNOSIS — S22.41XS CLOSED FRACTURE OF MULTIPLE RIBS OF RIGHT SIDE, SEQUELA: ICD-10-CM
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
## 2017-12-11 DIAGNOSIS — S22.41XA CLOSED FRACTURE OF MULTIPLE RIBS OF RIGHT SIDE, INITIAL ENCOUNTER: ICD-10-CM

## 2017-12-11 PROCEDURE — 99308 SBSQ NF CARE LOW MDM 20: CPT | Performed by: NURSE PRACTITIONER

## 2017-12-11 NOTE — PROGRESS NOTES
Amber   : 3/22/1924  Age 80year old  male patient is admitted to [facility] [place]  17 for rehab and strengthening     Admitted to [facility] on:  17 to 17    Chief complaint:  Disorientation status post fall.  Generalized palpation has lessened.  Reports no change is bowels or bladder. No headaches or n/v. No new issues or concerns. Stable.     ALLERGIES:  No Known Allergies    CODE STATUS:  Full Code    ADVANCED CARE PLANNING TEAM: will need another family care plan, home chest pain, no palpitations   GI: denies nausea, vomiting, constipation, diarrhea; no rectal bleeding; no heartburn  :no dysuria, urgency or frequency; no epididymal or testicular pain; no penile discharge; no hernias; no nocturia: no hematuria  MUSCULOS instability.  S/p fall and hit head   -  Cont PT/OT/SPEECH, progressing fair- improving  -Fall risk, parameters to be in place   -monitor  3.       Mild dementia.   - oriented x 2 to 3  -very pleasant and cooperative   -cont donepezil 5 mg po q day   4.

## 2017-12-12 ENCOUNTER — SNF VISIT (OUTPATIENT)
Dept: INTERNAL MEDICINE CLINIC | Facility: SKILLED NURSING FACILITY | Age: 82
End: 2017-12-12

## 2017-12-12 VITALS
DIASTOLIC BLOOD PRESSURE: 70 MMHG | RESPIRATION RATE: 20 BRPM | HEART RATE: 60 BPM | SYSTOLIC BLOOD PRESSURE: 159 MMHG | WEIGHT: 157.19 LBS | OXYGEN SATURATION: 97 % | BODY MASS INDEX: 24 KG/M2 | TEMPERATURE: 98 F

## 2017-12-12 DIAGNOSIS — S22.41XS CLOSED FRACTURE OF MULTIPLE RIBS OF RIGHT SIDE, SEQUELA: Primary | ICD-10-CM

## 2017-12-12 DIAGNOSIS — R53.1 GENERALIZED WEAKNESS: ICD-10-CM

## 2017-12-12 PROCEDURE — 99308 SBSQ NF CARE LOW MDM 20: CPT | Performed by: NURSE PRACTITIONER

## 2017-12-12 NOTE — PROGRESS NOTES
Jesse Rod  : 3/22/1924  Age 80year old  male patient is admitted to [facility] 17 for rehab and strengthening     Admitted to [facility] on: 17 to 17    Chief complaint: Disorientation status post fall.  Generalized we Stable.     ALLERGIES:  No Known Allergies    CODE STATUS:  Full Code    ADVANCED CARE PLANNING TEAM: will need another family care plan, home 12/21/17 back to [facility]  need additional resources or [place] a better option if possible    CURRENT MEDICA heartburn  :no dysuria, urgency or frequency; no epididymal or testicular pain; no penile discharge; no hernias; no nocturia: no hematuria  MUSCULOSKELETAL:no joint complaints upper or lower extremities. , slight rib tenderness  NEURO:no sensory or motor place   -monitor  3.       Mild dementia.   - oriented x 2 to 3  -very pleasant and cooperative   -cont donepezil 5 mg po q day   4.       Prediabetes.   -accuchecks bid, 145's to 175's  -cont metformin 500mg po daily  -dietician consult, to follow in rehab

## 2017-12-18 ENCOUNTER — SNF VISIT (OUTPATIENT)
Dept: INTERNAL MEDICINE CLINIC | Facility: SKILLED NURSING FACILITY | Age: 82
End: 2017-12-18

## 2017-12-18 VITALS
RESPIRATION RATE: 20 BRPM | DIASTOLIC BLOOD PRESSURE: 42 MMHG | SYSTOLIC BLOOD PRESSURE: 101 MMHG | BODY MASS INDEX: 24 KG/M2 | HEART RATE: 60 BPM | TEMPERATURE: 97 F | OXYGEN SATURATION: 97 % | WEIGHT: 159.63 LBS
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
## 2017-12-18 DIAGNOSIS — E86.0 DEHYDRATION: ICD-10-CM

## 2017-12-18 DIAGNOSIS — R53.1 GENERALIZED WEAKNESS: ICD-10-CM

## 2017-12-18 DIAGNOSIS — S22.41XS CLOSED FRACTURE OF MULTIPLE RIBS OF RIGHT SIDE, SEQUELA: ICD-10-CM

## 2017-12-18 PROCEDURE — 99308 SBSQ NF CARE LOW MDM 20: CPT | Performed by: NURSE PRACTITIONER

## 2017-12-18 NOTE — PROGRESS NOTES
Srini Owens  : 3/22/1924  Age 80year old  male patient is admitted to [facility] 17 for rehab and strengthening     Admitted to [facility] CL[facility] on:  17 to 17    Chief complaint: Disorientation status post fall.  Generalized w [employer], with additional resources, home eval went well today    CURRENT MEDICATIONS -reviewed and updated    Current Outpatient Prescriptions:  MetFORMIN HCl 500 MG Oral Tab Take 1 tablet (500 mg total) by mouth daily with breakfast. Disp: 30 tablet complaints upper or lower extremities. , slight rib tenderness  NEURO:no sensory or motor complaint  PSYCHE: no symptoms of depression or anxiety  HEMATOLOGY:denies hx anemia  ENDOCRINE: denies excessive thirst or urination; denies unexpected wt gain or wt po daily  -dietician consult, to follow in rehab  -followed closely by PCP Dr. Weinstein.  HTN- slightly elevated before meds, wnl after meds  -vs q shift  -cont carvedilol 6.5mg po bid  -cont hydrochlorothiazide 25mg q day  -cont cozaar 50mg po daily    6

## 2017-12-20 ENCOUNTER — SNF VISIT (OUTPATIENT)
Dept: INTERNAL MEDICINE CLINIC | Facility: SKILLED NURSING FACILITY | Age: 82
End: 2017-12-20

## 2017-12-20 VITALS
DIASTOLIC BLOOD PRESSURE: 72 MMHG | OXYGEN SATURATION: 97 % | BODY MASS INDEX: 24 KG/M2 | WEIGHT: 158 LBS | HEART RATE: 60 BPM | SYSTOLIC BLOOD PRESSURE: 162 MMHG | RESPIRATION RATE: 20 BRPM | TEMPERATURE: 97 F

## 2017-12-20 DIAGNOSIS — S22.41XS CLOSED FRACTURE OF MULTIPLE RIBS OF RIGHT SIDE, SEQUELA: ICD-10-CM

## 2017-12-20 DIAGNOSIS — R53.1 GENERALIZED WEAKNESS: ICD-10-CM
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
## 2017-12-20 PROCEDURE — 99309 SBSQ NF CARE MODERATE MDM 30: CPT | Performed by: NURSE PRACTITIONER

## 2017-12-20 NOTE — PROGRESS NOTES
Alesia Sweeney, 3/22/1924, 80year old, male is being discharged from [place] [address] 12/21/17    DISCHARGE SUMMARY    Date of Admission [address] [place] [address] Avenue: 11/28/17 TO 11/30/17    Date of Discharge [facility]: 11/30/17 to 12/21/17                                 Admi risk for readmission but improved, would benefit from additional resources in [place] or changed to [place].       REVIEW OF SYSTEMS:  GENERAL HEALTH:feels well otherwise, oriented x 2 to 3  SKIN: denies any unusual skin lesions or rashes  WOUNDS: none  EYES:no visua edema  NEUROLOGIC: follows commands  PSYCHIATRIC: alert and oriented x 2 to 3; affect appropriate       MEDICAL DECISION MAKING  capcecy      SEE PLAN BELOW  1. Closed fracture of multiple ribs of right side- pain improved  -Conservative management  -christian

## 2018-01-01 ENCOUNTER — MYAURORA ACCOUNT LINK (OUTPATIENT)
Dept: OTHER | Age: 83
End: 2018-01-01

## 2018-01-01 NOTE — DISCHARGE SUMMARY
[facility]    PATIENT'S NAME: [facility]   ATTENDING PHYSICIAN: Dru Key MD   PATIENT ACCOUNT#:   [id number]    LOCATION:  [address] [place] [address] #:   [id number]       YOB: 1924  ADMISSION DATE:       11/28/2 DATE:  None. DISCHARGE DIET, ACTIVITY, AND MEDICATIONS:  See discharge instructions. ARRANGEMENT FOR FUTURE CARE:  The patient will be followed by Dr. Yuliet Nolan at [facility]. He will see Dr. Camille Arteaga after discharge. Dictated By Leigh Arteaga MD

## 2018-01-02 ENCOUNTER — MYAURORA ACCOUNT LINK (OUTPATIENT)
Dept: OTHER | Age: 83
End: 2018-01-02

## 2018-01-16 ENCOUNTER — APPOINTMENT (OUTPATIENT)
Dept: HEMATOLOGY/ONCOLOGY | Facility: HOSPITAL | Age: 83
End: 2018-01-16
Attending: INTERNAL MEDICINE
Payer: MEDICARE

## 2018-02-01 ENCOUNTER — APPOINTMENT (OUTPATIENT)
Dept: HEMATOLOGY/ONCOLOGY | Facility: HOSPITAL | Age: 83
End: 2018-02-01
Attending: INTERNAL MEDICINE
Payer: MEDICARE

## 2018-05-06 ENCOUNTER — HOSPITAL ENCOUNTER (INPATIENT)
Facility: HOSPITAL | Age: 83
LOS: 5 days | Discharge: NURSING, REHAB OR LONG-TERM CARE | DRG: 194 | End: 2018-05-11
Attending: EMERGENCY MEDICINE | Admitting: FAMILY MEDICINE
Payer: MEDICARE

## 2018-05-06 ENCOUNTER — APPOINTMENT (OUTPATIENT)
Dept: CT IMAGING | Facility: HOSPITAL | Age: 83
DRG: 194 | End: 2018-05-06
Attending: EMERGENCY MEDICINE
Payer: MEDICARE

## 2018-05-06 ENCOUNTER — APPOINTMENT (OUTPATIENT)
Dept: GENERAL RADIOLOGY | Facility: HOSPITAL | Age: 83
DRG: 194 | End: 2018-05-06
Attending: EMERGENCY MEDICINE
Payer: MEDICARE
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
## 2018-05-06 DIAGNOSIS — J18.9 NOSOCOMIAL PNEUMONIA: Primary | ICD-10-CM

## 2018-05-06 PROCEDURE — 85025 COMPLETE CBC W/AUTO DIFF WBC: CPT | Performed by: EMERGENCY MEDICINE

## 2018-05-06 PROCEDURE — 93010 ELECTROCARDIOGRAM REPORT: CPT | Performed by: EMERGENCY MEDICINE

## 2018-05-06 PROCEDURE — 81001 URINALYSIS AUTO W/SCOPE: CPT | Performed by: EMERGENCY MEDICINE

## 2018-05-06 PROCEDURE — 93005 ELECTROCARDIOGRAM TRACING: CPT

## 2018-05-06 PROCEDURE — 80048 BASIC METABOLIC PNL TOTAL CA: CPT | Performed by: EMERGENCY MEDICINE

## 2018-05-06 PROCEDURE — 81001 URINALYSIS AUTO W/SCOPE: CPT | Performed by: FAMILY MEDICINE

## 2018-05-06 PROCEDURE — 36415 COLL VENOUS BLD VENIPUNCTURE: CPT

## 2018-05-06 PROCEDURE — 82962 GLUCOSE BLOOD TEST: CPT

## 2018-05-06 PROCEDURE — 96365 THER/PROPH/DIAG IV INF INIT: CPT

## 2018-05-06 PROCEDURE — 87040 BLOOD CULTURE FOR BACTERIA: CPT | Performed by: EMERGENCY MEDICINE

## 2018-05-06 PROCEDURE — 87641 MR-STAPH DNA AMP PROBE: CPT | Performed by: EMERGENCY MEDICINE

## 2018-05-06 PROCEDURE — 80076 HEPATIC FUNCTION PANEL: CPT | Performed by: EMERGENCY MEDICINE

## 2018-05-06 PROCEDURE — 83036 HEMOGLOBIN GLYCOSYLATED A1C: CPT | Performed by: FAMILY MEDICINE

## 2018-05-06 PROCEDURE — 70450 CT HEAD/BRAIN W/O DYE: CPT | Performed by: EMERGENCY MEDICINE

## 2018-05-06 PROCEDURE — 83605 ASSAY OF LACTIC ACID: CPT | Performed by: EMERGENCY MEDICINE

## 2018-05-06 PROCEDURE — 99285 EMERGENCY DEPT VISIT HI MDM: CPT

## 2018-05-06 PROCEDURE — 71045 X-RAY EXAM CHEST 1 VIEW: CPT | Performed by: EMERGENCY MEDICINE

## 2018-05-06 RX ORDER — ALFUZOSIN HYDROCHLORIDE 10 MG/1
10 TABLET, EXTENDED RELEASE ORAL
Status: DISCONTINUED | OUTPATIENT
Start: 2018-05-07 | End: 2018-05-11

## 2018-05-06 RX ORDER — ACETAMINOPHEN 325 MG/1
650 TABLET ORAL 3 TIMES DAILY PRN
COMMUNITY

## 2018-05-06 RX ORDER — CHOLECALCIFEROL (VITAMIN D3) 125 MCG
5000 CAPSULE ORAL DAILY
Status: DISCONTINUED | OUTPATIENT
Start: 2018-05-06 | End: 2018-05-09

## 2018-05-06 RX ORDER — LOSARTAN POTASSIUM 50 MG/1
50 TABLET ORAL DAILY
Status: DISCONTINUED | OUTPATIENT
Start: 2018-05-06 | End: 2018-05-11

## 2018-05-06 RX ORDER — CARVEDILOL 6.25 MG/1
6.25 TABLET ORAL NIGHTLY
Status: DISCONTINUED | OUTPATIENT
Start: 2018-05-06 | End: 2018-05-11

## 2018-05-06 RX ORDER — CHOLECALCIFEROL (VITAMIN D3) 25 MCG
5000 TABLET,CHEWABLE ORAL DAILY
COMMUNITY
End: 2018-08-13

## 2018-05-06 RX ORDER — DONEPEZIL HYDROCHLORIDE 5 MG/1
5 TABLET, FILM COATED ORAL NIGHTLY
Status: DISCONTINUED | OUTPATIENT
Start: 2018-05-06 | End: 2018-05-11

## 2018-05-06 RX ORDER — ACETAMINOPHEN 325 MG/1
650 TABLET ORAL 3 TIMES DAILY PRN
Status: DISCONTINUED | OUTPATIENT
Start: 2018-05-06 | End: 2018-05-11

## 2018-05-06 RX ORDER — DEXTROSE MONOHYDRATE 25 G/50ML
50 INJECTION, SOLUTION INTRAVENOUS AS NEEDED
Status: DISCONTINUED | OUTPATIENT
Start: 2018-05-06 | End: 2018-05-11

## 2018-05-06 RX ORDER — ACETAMINOPHEN 325 MG/1
650 TABLET ORAL ONCE
Status: COMPLETED | OUTPATIENT
Start: 2018-05-06 | End: 2018-05-06

## 2018-05-06 NOTE — ED PROVIDER NOTES
Patient Seen in: [facility] Emergency Department    History   Patient presents with:  Fall (musculoskeletal, neurologic)    Stated Complaint:     HPI    Patient is a 17-year-old man that has transferred here by paramedics from [facility] care.   He rep well-nourished. HEENT:   Head: Normocephalic and atraumatic.    Right Ear: External ear normal.   Left Ear: External ear normal.   Nose: Nose normal.   Mouth/Throat: Oropharynx is clear and moist.   Eyes: Conjunctivae and EOM are normal. Pupils are equal, PLASMA - Normal   CBC WITH DIFFERENTIAL WITH PLATELET    Narrative: The following orders were created for panel order CBC WITH DIFFERENTIAL WITH PLATELET.   Procedure                               Abnormality         Status                     --------- 13:05     Approved by (CST): Ehsan Rodriguez MD on 5/06/2018 at 13:07                    Disposition and Plan     Clinical Impression:  Nosocomial pneumonia  (primary encounter diagnosis)    Disposition:  Admit  5/6/2018  3:12 pm    Follow-up:  No follow-

## 2018-05-06 NOTE — ED INITIAL ASSESSMENT (HPI)
From [facility] for c/o weakness and 5 falls in past week, last fall yesterday, unwitnessed. Pt c/o low back pain. Per med list from NH, no blood thinner use.

## 2018-05-06 NOTE — ED NOTES
Rec'd patient lying on cart with son at bedside offering no new complaints at this time. Blood cultures x 2 set and lactic acid drawn per shukri Woodruff and sent. Abx hung and infusing via pump over 30 min's. Patient awaiting CT of head.

## 2018-05-07 ENCOUNTER — APPOINTMENT (OUTPATIENT)
Dept: GENERAL RADIOLOGY | Facility: HOSPITAL | Age: 83
DRG: 194 | End: 2018-05-07
Attending: FAMILY MEDICINE
Payer: MEDICARE

## 2018-05-07 PROCEDURE — 83036 HEMOGLOBIN GLYCOSYLATED A1C: CPT | Performed by: INTERNAL MEDICINE

## 2018-05-07 PROCEDURE — 80053 COMPREHEN METABOLIC PANEL: CPT | Performed by: FAMILY MEDICINE

## 2018-05-07 PROCEDURE — 80061 LIPID PANEL: CPT | Performed by: FAMILY MEDICINE

## 2018-05-07 PROCEDURE — 82962 GLUCOSE BLOOD TEST: CPT

## 2018-05-07 PROCEDURE — 85025 COMPLETE CBC W/AUTO DIFF WBC: CPT | Performed by: FAMILY MEDICINE

## 2018-05-07 PROCEDURE — 73590 X-RAY EXAM OF LOWER LEG: CPT | Performed by: FAMILY MEDICINE

## 2018-05-07 NOTE — H&P
[facility]    PATIENT'S NAME: Sidra Graza   ATTENDING PHYSICIAN: Dru Nicholson MD   PATIENT ACCOUNT#:   [id number]    LOCATION:  [address] [place] [address] #:   [id number]       YOB: 1924  ADMISSION DATE:       05/06/ rarely drinks alcohol and does not participate in illicit drug use. SOCIAL HISTORY:  Patient has been  for over 6 years.   He is living in assisted living at [facility].  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 Further recommendations pending clinical course. Dictated By Dhruv Denton MD  d: 05/07/2018 09:02:24  t: 05/07/2018 09:15:25  Job [id number]  [facility]

## 2018-05-07 NOTE — PLAN OF CARE
Problem: Patient Centered Care  Goal: Patient preferences are identified and integrated in the patient's plan of care  Interventions:  - What would you like us to know as we care for you? Lives at memory care unit.    - Provide timely, complete, and accurat

## 2018-05-07 NOTE — PROGRESS NOTES
[facility] Dosing Service  Antibiotic Dosing    Arturo Madeline is a 80year old male for whom pharmacy is dosing Zosyn for treatment of  pneumonia. Allergies: is allergic to bees. Vitals: /57 (BP Location: Right arm)   Pulse 59   Temp 97. 7

## 2018-05-07 NOTE — CM/SW NOTE
Per RN, pt is from [address] [place] [address] in the [address] [place] [address]. SW left VM for son/Dr. Efrain Mata to clarify.     Judy Nogueira, [address]

## 2018-05-08 PROCEDURE — 82962 GLUCOSE BLOOD TEST: CPT

## 2018-05-08 RX ORDER — DOCUSATE SODIUM 100 MG/1
100 CAPSULE, LIQUID FILLED ORAL 2 TIMES DAILY
Status: DISCONTINUED | OUTPATIENT
Start: 2018-05-08 | End: 2018-05-11

## 2018-05-08 RX ORDER — QUETIAPINE 25 MG/1
25 TABLET, FILM COATED ORAL NIGHTLY
Status: DISCONTINUED | OUTPATIENT
Start: 2018-05-08 | End: 2018-05-11

## 2018-05-08 NOTE — CM/SW NOTE
Met with patient at bedside and spoke with son to explain the BPCI/Medicare program. Patients son  agreed with phone follow up for 3 months from [address] [place] [address] after discharge from [facility]. Patient was enrolled under .   BPCI/Medicare letter and brochur

## 2018-05-08 NOTE — PLAN OF CARE
Problem: Patient Centered Care  Goal: Patient preferences are identified and integrated in the patient's plan of care  Interventions:  - What would you like us to know as we care for you? Lives at memory care unit.    - Provide timely, complete, and accurat and severity of pain and evaluate response  - Implement non-pharmacological measures as appropriate and evaluate response  - Consider cultural and social influences on pain and pain management  - Manage/alleviate anxiety  - Utilize distraction and/or relax on physician/LIP order or complex needs related to functional status, cognitive ability or social support system   Outcome: Progressing  When cleared medically.

## 2018-05-08 NOTE — DIETARY NOTE
ADULT NUTRITION INITIAL ASSESSMENT    Pt is at moderate nutrition risk. Pt meets malnutrition criteria.       RECOMMENDATIONS TO MD:  None at this time     NUTRITION DIAGNOSIS/PROBLEM:  Inadequate oral intake related to decreased ability to consume suffici BMI Classification: 19-24.9 kg/m2 - WNL  IBW: 160#         94% IBW       Usual Body Wt: 159# in dec 2017; 168# in Mar 2017--wt slowly trending down         89% UBW  WEIGHT HISTORY:  Wt Readings from Last 6 Encounters:  05/06/18 : 68 kg (150 lb)  12/20/17 :

## 2018-05-08 NOTE — PROGRESS NOTES
[facility]    Progress Note    Zachary Fairly Patient Status:  Inpatient    3/22/1924 MRN [id number]   Location [facility] 5SW/SE Attending Orion Hoffman MD   Hosp Day # 2 PCP Jaylen Gonzalez MD       Subjective:   Sammy Man 05/07/18 2129   PGLU  138*  126*  153*  137*       Scheduled Meds:   • Insulin Aspart Pen  1-5 Units Subcutaneous TID CC   • carvedilol  6.25 mg Oral Nightly   • Cholecalciferol  2,000 Units Oral Daily   • Vitamin B-12  5,000 mcg Oral Daily   • Donepezil Pacemaker ECG, No further analysis INSUFFICIENT DATA When compared with ECG of 11/28/2017 17:18:50 No change Electronically signed on 05/07/2018 at 06:55 by Elana Narnajo MD  5/8/2018

## 2018-05-09 PROCEDURE — A4216 STERILE WATER/SALINE, 10 ML: HCPCS

## 2018-05-09 PROCEDURE — 82962 GLUCOSE BLOOD TEST: CPT

## 2018-05-09 RX ORDER — 0.9 % SODIUM CHLORIDE 0.9 %
VIAL (ML) INJECTION
Status: COMPLETED
Start: 2018-05-09 | End: 2018-05-09

## 2018-05-09 RX ORDER — CHOLECALCIFEROL (VITAMIN D3) 125 MCG
1000 CAPSULE ORAL DAILY
Status: DISCONTINUED | OUTPATIENT
Start: 2018-05-09 | End: 2018-05-11

## 2018-05-09 NOTE — PLAN OF CARE
Problem: Patient Centered Care  Goal: Patient preferences are identified and integrated in the patient's plan of care  Interventions:  - What would you like us to know as we care for you? Lives at memory care unit.    - Provide timely, complete, and accurat social influences on pain and pain management  - Manage/alleviate anxiety  - Utilize distraction and/or relaxation techniques  - Monitor for opioid side effects  - Notify MD/LIP if interventions unsuccessful or patient reports new pain  - Anticipate increa in their care  Interventions:  - Assess communication ability and preferred communication style  - Implement communication aides and strategies  - Use visual cues when possible  - Listen attentively, be patient, do not interrupt  - Minimize distractions  -

## 2018-05-09 NOTE — PLAN OF CARE
Problem: Patient Centered Care  Goal: Patient preferences are identified and integrated in the patient's plan of care  Interventions:  - What would you like us to know as we care for you? Lives at memory care unit.    - Provide timely, complete, and accurat type and severity of pain and evaluate response  - Implement non-pharmacological measures as appropriate and evaluate response  - Consider cultural and social influences on pain and pain management  - Manage/alleviate anxiety  - Utilize distraction and/or the patient needs post-hospital services based on physician/LIP order or complex needs related to functional status, cognitive ability or social support system   Outcome: Progressing  When cleared medically.

## 2018-05-09 NOTE — PLAN OF CARE
The EMR was down for 2 hours on 5/9/2018. Augusto Mason was responsible for completing the paper charting during this time period.      The following information was re-entered into the system by Daniel ANDERSON: N/A    The following information will remain in the p

## 2018-05-09 NOTE — PROGRESS NOTES
[facility]    Progress Note    Mliss Ebbs Patient Status:  Inpatient    3/22/1924 MRN [id number]   Location [facility] 5SW/SE Attending Juan Manuel Fair MD   Hosp Day # 3 PCP Benito Jurado MD       Subjective:   Bisi Cobos 05/08/18   2147   PGLU  138*  168*  188*  315*       Scheduled Meds:   • docusate sodium  100 mg Oral BID   • QUEtiapine Fumarate  25 mg Oral Nightly   • Insulin Aspart Pen  1-5 Units Subcutaneous TID CC   • carvedilol  6.25 mg Oral Nightly   • Cholecalcif

## 2018-05-10 PROCEDURE — 97162 PT EVAL MOD COMPLEX 30 MIN: CPT

## 2018-05-10 PROCEDURE — 82962 GLUCOSE BLOOD TEST: CPT

## 2018-05-10 PROCEDURE — 97530 THERAPEUTIC ACTIVITIES: CPT

## 2018-05-10 RX ORDER — BISACODYL 10 MG
10 SUPPOSITORY, RECTAL RECTAL ONCE
Status: COMPLETED | OUTPATIENT
Start: 2018-05-10 | End: 2018-05-10

## 2018-05-10 NOTE — PLAN OF CARE
Problem: Patient Centered Care  Goal: Patient preferences are identified and integrated in the patient's plan of care  Interventions:  - What would you like us to know as we care for you? Lives at memory care unit.    - Provide timely, complete, and accurat appropriate pain scale  - Administer analgesics based on type and severity of pain and evaluate response  - Implement non-pharmacological measures as appropriate and evaluate response  - Consider cultural and social influences on pain and pain management post-hospital services based on physician/LIP order or complex needs related to functional status, cognitive ability or social support system   Outcome: Progressing      Problem: Altered Communication/Language Barrier  Goal: Patient/Family is able to under

## 2018-05-10 NOTE — CM/SW NOTE
LEXY recieced MDO for SNF vs memory care unit. Referral was previously placed to [facility], who is able to accept pt at discharge.     Brodie Pitts, [address]

## 2018-05-10 NOTE — PHYSICAL THERAPY NOTE
PHYSICAL THERAPY EVALUATION - INPATIENT     Room Number: 556/556-A  Evaluation Date: 5/10/2018  Type of Evaluation: Initial   Physician Order: PT Eval and Treat    Presenting Problem:  (Pneumonia, Weaknes, confused,recent Multiple falls)  Reason for [facility] services to address these deficits in preparation for discharge. Recommend continuation of skilled PT in a rehab setting so pt can maximize functional potential and achieve PLF.       DISCHARGE RECOMMENDATIONS  PT Discharge Recommendations: 24 hour care/[address]    HOME SITUATION  Type of Home: Assisted living facility (Cassidy Cid in Memory unit)   Home Layout: One level                Lives With: Staff 24 hours  Drives: No  Patient Owned Equipment: Rolling walker  Patient Regularly Uses:  (Amb with 38.1   CMS Modifier (G-Code): CL    FUNCTIONAL ABILITY STATUS  Gait Assessment   Gait Assistance:  (Min Ax2)  Distance (ft): 4steps  Assistive Device: Rolling walker  Pattern:  (small shuffling steps)  Stoop/Curb Assistance: Not tested       Bed Mobility:

## 2018-05-10 NOTE — PROGRESS NOTES
[facility]    Progress Note    Ming Jones Patient Status:  Inpatient    3/22/1924 MRN [id number]   Location [facility] 5SW/SE Attending Twila Donnelly MD   Hosp Day # 4 PCP Adolfo Fontanez MD       Subjective:   Sun Rodriges 05/09/18   1747  05/09/18   2114  05/10/18   0757   PGLU  158*  156*  137*  138*       Scheduled Meds:   • Vitamin B-12  1,000 mcg Oral Daily   • docusate sodium  100 mg Oral BID   • QUEtiapine Fumarate  25 mg Oral Nightly   • Insulin Aspart Pen  1-5 Unit

## 2018-05-11 VITALS
BODY MASS INDEX: 22.22 KG/M2 | WEIGHT: 150 LBS | HEART RATE: 59 BPM | TEMPERATURE: 98 F | OXYGEN SATURATION: 98 % | SYSTOLIC BLOOD PRESSURE: 120 MMHG | DIASTOLIC BLOOD PRESSURE: 41 MMHG | HEIGHT: 69 IN | RESPIRATION RATE: 18 BRPM

## 2018-05-11 PROCEDURE — 82962 GLUCOSE BLOOD TEST: CPT

## 2018-05-11 PROCEDURE — 99211 OFF/OP EST MAY X REQ PHY/QHP: CPT

## 2018-05-11 RX ORDER — PSEUDOEPHEDRINE HCL 30 MG
100 TABLET ORAL 2 TIMES DAILY
Qty: 60 CAPSULE | Refills: 0 | Status: SHIPPED | OUTPATIENT
Start: 2018-05-11 | End: 2018-07-05

## 2018-05-11 RX ORDER — QUETIAPINE 25 MG/1
25 TABLET, FILM COATED ORAL NIGHTLY
Qty: 30 TABLET | Refills: 0 | Status: SHIPPED | OUTPATIENT
Start: 2018-05-11 | End: 2018-07-05

## 2018-05-11 RX ORDER — AMOXICILLIN AND CLAVULANATE POTASSIUM 875; 125 MG/1; MG/1
1 TABLET, FILM COATED ORAL 2 TIMES DAILY
Qty: 4 TABLET | Refills: 0 | Status: SHIPPED | OUTPATIENT
Start: 2018-05-11 | End: 2018-05-13

## 2018-05-11 NOTE — CM/SW NOTE
LEXY was notified that pt is able to d/c today    LEXY notified Bo Berrios; agreeable w/ 2p d/c time  RN agreeable w/ d/c time  SW left  for son/Dr. Hawa De Paz. Work phone number listed is unavailable.   LEXY contacted [facility] for ambulance (dementia - elopement r

## 2018-05-11 NOTE — WOUND PROGRESS NOTE
WOUND CARE NOTE      PLAN   Recommendations:  Glucose control to help promote wound healing    Wound(s)  Location: LEFT HAND  Cleansing  Wound Cleanser  Topical XEROFORM  Dressings 2X2  Secure with NANCI  Frequency DAILY     SUBJECTIVE   \"HELLO\"    OBJ

## 2018-05-11 NOTE — PROGRESS NOTES
[facility]    Progress Note    Jesse Rod Patient Status:  Inpatient    3/22/1924 MRN [id number]   Location [facility] 5SW/SE Attending Magdalena Carney MD   Hosp Day # 5 PCP Jonathan Samaniego MD       Subjective:   Nelson Pepe 05/10/18   2051   PGLU  138*  137*  161*  172*       Scheduled Meds:   • Vitamin B-12  1,000 mcg Oral Daily   • docusate sodium  100 mg Oral BID   • QUEtiapine Fumarate  25 mg Oral Nightly   • Insulin Aspart Pen  1-5 Units Subcutaneous TID CC   • carvedilo

## 2018-05-11 NOTE — PLAN OF CARE
Altered Communication/Language Barrier    • Patient/Family is able to understand and participate in their care Completed        Diabetes/Glucose Control    • Glucose maintained within prescribed range Completed        DISCHARGE PLANNING    • Discharge to

## 2018-06-12 NOTE — DISCHARGE SUMMARY
[facility]    PATIENT'S NAME: April Daiz   ATTENDING PHYSICIAN: Dru Hooker MD   PATIENT ACCOUNT#:   [id number]    LOCATION:  [address] [place] [address] #:   [id number]       YOB: 1924  ADMISSION DATE:       05/06/ 07:35:26  [facility] [id number]  [facility]    cc: Nhan Rosenbaum.  Long Santizo MD

## 2019-01-01 ENCOUNTER — DIAGNOSTIC TRANS (OUTPATIENT)
Dept: OTHER | Age: 84
End: 2019-01-01

## 2019-01-01 ENCOUNTER — HOSPITAL (OUTPATIENT)
Dept: OTHER | Age: 84
End: 2019-01-01
Attending: INTERNAL MEDICINE

## 2019-01-01 ENCOUNTER — HOSPITAL (OUTPATIENT)
Dept: OTHER | Age: 84
End: 2019-01-01

## 2019-01-01 LAB
ANALYZER ANC (IANC): ABNORMAL
ANALYZER ANC (IANC): ABNORMAL
ANION GAP SERPL CALC-SCNC: 16 MMOL/L (ref 10–20)
BASOPHILS # BLD: 0 THOUSAND/MCL (ref 0–0.3)
BASOPHILS # BLD: 0 THOUSAND/MCL (ref 0–0.3)
BASOPHILS NFR BLD: 0 %
BASOPHILS NFR BLD: 0 %
BUN SERPL-MCNC: 23 MG/DL (ref 6–20)
BUN/CREAT SERPL: 23 (ref 7–25)
CALCIUM SERPL-MCNC: 9.2 MG/DL (ref 8.4–10.2)
CHLORIDE: 104 MMOL/L (ref 98–107)
CHOLEST SERPL-MCNC: 204 MG/DL
CHOLEST/HDLC SERPL: 5.8 {RATIO}
CO2 SERPL-SCNC: 25 MMOL/L (ref 21–32)
CREAT SERPL-MCNC: 1.02 MG/DL (ref 0.67–1.17)
DIFFERENTIAL METHOD BLD: ABNORMAL
DIFFERENTIAL METHOD BLD: ABNORMAL
EOSINOPHIL # BLD: 0 THOUSAND/MCL (ref 0.1–0.5)
EOSINOPHIL # BLD: 0.1 THOUSAND/MCL (ref 0.1–0.5)
EOSINOPHIL NFR BLD: 0 %
EOSINOPHIL NFR BLD: 2 %
ERYTHROCYTE [DISTWIDTH] IN BLOOD: 14.2 % (ref 11–15)
ERYTHROCYTE [DISTWIDTH] IN BLOOD: 14.2 % (ref 11–15)
GLUCOSE BLDC GLUCOMTR-MCNC: 148 MG/DL (ref 65–99)
GLUCOSE SERPL-MCNC: 153 MG/DL (ref 65–99)
HDLC SERPL-MCNC: 35 MG/DL
HEMATOCRIT: 37.7 % (ref 39–51)
HEMATOCRIT: 39.8 % (ref 39–51)
HGB BLD-MCNC: 12.5 GM/DL (ref 13–17)
HGB BLD-MCNC: 12.8 GM/DL (ref 13–17)
IMM GRANULOCYTES # BLD AUTO: 0 THOUSAND/MCL (ref 0–0.2)
IMM GRANULOCYTES # BLD AUTO: 0 THOUSAND/MCL (ref 0–0.2)
IMM GRANULOCYTES NFR BLD: 0 %
IMM GRANULOCYTES NFR BLD: 0 %
LDLC SERPL CALC-MCNC: 151 MG/DL
LYMPHOCYTES # BLD: 0.4 THOUSAND/MCL (ref 1–4)
LYMPHOCYTES # BLD: 1.2 THOUSAND/MCL (ref 1–4)
LYMPHOCYTES NFR BLD: 15 %
LYMPHOCYTES NFR BLD: 4 %
MAGNESIUM SERPL-MCNC: 2 MG/DL (ref 1.7–2.4)
MCH RBC QN AUTO: 28.3 PG (ref 26–34)
MCH RBC QN AUTO: 29 PG (ref 26–34)
MCHC RBC AUTO-ENTMCNC: 32.2 GM/DL (ref 32–36.5)
MCHC RBC AUTO-ENTMCNC: 33.2 GM/DL (ref 32–36.5)
MCV RBC AUTO: 87.5 FL (ref 78–100)
MCV RBC AUTO: 88.1 FL (ref 78–100)
MONOCYTES # BLD: 0.6 THOUSAND/MCL (ref 0.3–0.9)
MONOCYTES # BLD: 0.7 THOUSAND/MCL (ref 0.3–0.9)
MONOCYTES NFR BLD: 6 %
MONOCYTES NFR BLD: 8 %
NEUTROPHILS # BLD: 10.6 THOUSAND/MCL (ref 1.8–7.7)
NEUTROPHILS # BLD: 5.8 THOUSAND/MCL (ref 1.8–7.7)
NEUTROPHILS NFR BLD: 75 %
NEUTROPHILS NFR BLD: 90 %
NEUTS SEG NFR BLD: ABNORMAL %
NEUTS SEG NFR BLD: ABNORMAL %
NONHDLC SERPL-MCNC: 169 MG/DL
NRBC (NRBCRE): 0 /100 WBC
NRBC (NRBCRE): 0 /100 WBC
PLATELET # BLD: 115 THOUSAND/MCL (ref 140–450)
PLATELET # BLD: 93 THOUSAND/MCL (ref 140–450)
POTASSIUM SERPL-SCNC: 4 MMOL/L (ref 3.4–5.1)
POTASSIUM SERPL-SCNC: 4.5 MMOL/L (ref 3.4–5.1)
RBC # BLD: 4.31 MILLION/MCL (ref 4.5–5.9)
RBC # BLD: 4.52 MILLION/MCL (ref 4.5–5.9)
SODIUM SERPL-SCNC: 140 MMOL/L (ref 135–145)
TRIGLYCERIDE (TRIGP): 92 MG/DL
TROPONIN I SERPL HS-MCNC: 0.02 NG/ML
WBC # BLD: 11.7 THOUSAND/MCL (ref 4.2–11)
WBC # BLD: 7.7 THOUSAND/MCL (ref 4.2–11)

## 2019-01-29 ENCOUNTER — TELEPHONE (OUTPATIENT)
Dept: SCHEDULING | Age: 84
End: 2019-01-29

## 2019-02-28 VITALS
BODY MASS INDEX: 25.74 KG/M2 | RESPIRATION RATE: 18 BRPM | DIASTOLIC BLOOD PRESSURE: 62 MMHG | OXYGEN SATURATION: 96 % | WEIGHT: 164 LBS | HEIGHT: 67 IN | SYSTOLIC BLOOD PRESSURE: 138 MMHG | HEART RATE: 60 BPM

## 2019-03-01 VITALS
HEART RATE: 62 BPM | WEIGHT: 166 LBS | SYSTOLIC BLOOD PRESSURE: 122 MMHG | DIASTOLIC BLOOD PRESSURE: 60 MMHG | RESPIRATION RATE: 16 BRPM

## (undated) NOTE — IP AVS SNAPSHOT
Patient Demographics     Address  [address] [place] [address] 23  PERSON [facility] [address] Phone  [phone] [facility])  [phone] (Mobile) *Preferred* E-mail Address  [email or web address]      Emergency Contact(s)     Name Relation Home Work Mobile    Er Donepezil HCl 5 MG Tabs  Commonly known as:  ARICEPT  Next dose due:  5/11/18 evening      Take 5 mg by mouth nightly. Losartan Potassium 50 MG Tabs  Commonly known as:  COZAAR  Next dose due:  5/12/18 morning      Take 1 tablet by mouth daily. 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 HABITS:  Patient is a former smoker who quit in 1972. He rarely drinks alcohol and does not participate in illicit drug use. SOCIAL HISTORY:  Patient has been  for over 6 years.   He is living in ass 4.   Status post defibrillator. 5.   Mild dementia. 6.   Glucose intolerance. PLAN:  Patient has been admitted to the general medical floor, and after appropriate cultures obtained, he was placed on Zosyn.   Further recommendations pending clinical cou perform rolling with CGAx1 and needed Mod Ax1 for supine to sit with VC for proper sequencing. Trained pt for sit<>stand with Min Ax1 with RW.  Pt gets very shaky upon standing and stated he is fearful of falling, needed constant reassurance that he is safe chronic atrial fibrillation with defibrillator who presents to the emergency room with a chief complaint of generalized weakness.        HISTORY OF PRESENT ILLNESS per MD notes in EMR: Patient has been more confused and has fallen multiple times in the last PAIN ASSESSMENT[ST.1]  Rating: Unable to rate  Location: c/o pain when trying to poop, RN was informed  Management Techniques: Activity promotion; Body mechanics;Repositioning[ST. 3]    COGNITION[ST.1]  · Pt is alert, oriented to name and place and is kellen Patient education and PT evaluation[ST.2]    Patient End of Session: With [facility] staff;Needs met;Call light within reach;RN aware of session/findings; All patient questions and concerns addressed; In bathroom - nursing staff aware[ST. 3]    CURRENT GOALS    Goals

## (undated) NOTE — IP AVS SNAPSHOT
Patient Demographics     Address  [address] [address] Phone  [phone] [facility]) E-mail Address  [email or web address]      Emergency Contact(s)     Name Relation Home Work [address] Son   [phone] Next dose due: Tomorrow morning      Take 1 tablet by mouth daily. simvastatin 80 MG Tabs  Commonly known as:  ZOCOR  Next dose due: Tonight at bedtime      Take 40 mg by mouth nightly.           tamsulosin HCl 0.4 MG Caps  Commonly known as:  FL [id number] docusate sodium (COLACE) cap 100 mg 11/30/17 1006 Given      [id number] multivitamin (ADULT) tab 1 tablet 11/30/17 1006 Given            RIGHT LOWER ABDOMEN     Order ID Medication Name Action Time Action Reason Comments    [id number] Heparin Sodi Procedure Component Value Units Date/Time    Emergency MRSA Screen by PCR STAT [[id number]]  (Normal) Collected:  11/28/17 1923    Order Status:  Completed Lab Status:  Final result Updated:  11/28/17 2057    Specimen:  Other from Nares      MRSA Screen By right fourth and fifth lateral fractures present. Defibrillator noted. The patient is now admitted for further evaluation and treatment.     PAST MEDICAL HISTORY:  Atherosclerotic heart disease, hypertension, hypercholesterolemia, glaucoma, history of hea states that he has had a 15-pound weight loss over the last 6 months. PHYSICAL EXAMINATION:    GENERAL:  Patient is an elderly white male in at least mild physical distress at this time.   VITAL SIGNS:  Temperature 97.9, pulse 62, respirations 18, bloo patient to review the appropriateness of Coumadin therapy moving forward, and also reevaluate any cardiac medications.   We will monitor blood sugars closely, although his hemoglobin A1c suggested overall he has been very well controlled over the last [address] shortness of breath,, no PND orthopnea, no lower extremity edema, no palpitations, dizziness or lightheadedness, no reports of syncope.   Patient is a limited historian  History     Past Medical History:   Diagnosis Date   • Essential hypertension    • Hype height is 5' 8\" (1.727 m) and weight is 149 lb 14.4 oz (68 kg). His oral temperature is 98.1 °F (36.7 °C). His blood pressure is 140/39 and his pulse is 88. His respiration is 18 and oxygen saturation is 96%.    Physical Exam  Sitting up in chair  Breathi Xr Dorsal Thoracic Lumbar Spine (cpt=72080)    Result Date: 11/29/2017  CONCLUSION:  1. Multilevel degenerative changes of the thoracic spine without radiographically discernible acute fracture deformity. 2. Mild thoracic spine scoliosis.   3. Partially vi 11/29/2017[MW.1]    Electronically signed by Yanelis Monroy MD on 11/29/2017  2:51 PM   Attribution Duenas    MW.1 - Yanelis Monroy MD on 11/29/2017  2:42 PM                     D/C Summary     No notes of this type exist for this encounter.          Physica assist 2-3 times/day to improve activity tolerance. Pt understands well.     Patient's current functional deficits include decrease generalized strength, decrease endurance, some Rt side chest pain at the fracture site affecting pt's safe mobility, transfer scan from the emergency room revealed no significant pathology. Rib x-rays revealed nondisplaced right lateral sixth and seventh rib fractures. There are old right fourth and fifth lateral fractures present. Defibrillator noted.  The patient is now admitted Static Standing: Fair  Dynamic Standing: Fair -      ACTIVITY TOLERANCE  O2 Saturation: 96% at rest, 94% after amb%  Room air  No shortness of breath  Heart Rate: 64bpm at rest and 81bpm after amb    AM-PAC '6-Clicks' INPATIENT SHORT FORM - BASIC MOBILITY Current Status    Goal #3 Patient is able to ambulate 200 feet with assist device: walker - rolling at assistance level: Close SBAx1   Goal #3   Current Status    Goal #4    Goal #4   Current Status    Goal #4 Patient to demonstrate independence with home Educated patient regarding benefits of upright activity and encouraged pt to get up in the chair 3 times/day. Encouraged pt to ambulate with nursing assist 2-3 times/day to improve activity tolerance. Pt understands well.     Patient's current functional de complains of pain in his midback region as well as his right anterior ribs. There is some swelling noted about his right occipital region. A CT scan from the emergency room revealed no significant pathology.  Rib x-rays revealed nondisplaced right lateral s Lower extremity ROM is within functional limits     Lower extremity strength is within functional limits     BALANCE  Static Sitting: Good  Dynamic Sitting: Good  Static Standing: Fair  Dynamic Standing: Fair -      ACTIVITY TOLERANCE  O2 Saturation: 96% a Current Status    Goal #2 Patient is able to demonstrate transfers EOB to/from Chair/Wheelchair at assistance level: supervision with walker - rolling     Goal #2  Current Status    Goal #3 Patient is able to ambulate 200 feet with assist device: walker -

## (undated) NOTE — IP AVS SNAPSHOT
[facility]            (For Outpatient Use Only) Initial Admit Date: 5/6/2018   Inpt/Obs Admit Date: Inpt: 5/6/18 / Obs: N/A   Discharge Date:    Carylon Kawasaki:  [de-identified]   MRN: [de-identified]   CSN: [id number]        ENCOUNTER  Patient Class: Subscriber Name:  Karyn Leal :    Subscriber ID:  Pt Rel to Subscriber:    Hospital Account Financial Class: Medicare    May 11, 2018

## (undated) NOTE — IP AVS SNAPSHOT
[facility]            (For Outpatient Use Only) Initial Admit Date: 11/28/2017   Inpt/Obs Admit Date: Inpt: N/A / Obs: 11/28/17   Discharge Date:    Harshal Cook:  [de-identified]   MRN: [de-identified]   CSN: [id number]        ENCOUNTER  Patient Cla Subscriber Name:  Cydney Cullen :    Subscriber ID:  Pt Rel to Subscriber:    Hospital Account Financial Class: Medicare    2017

## (undated) NOTE — ED AVS SNAPSHOT
Mr. Avitia Lung   MRN: [id number]    Department:  [facility] Emergency Department   Date of Visit:  11/14/2017           Disclosure     Insurance plans vary and the physician(s) referred by the ER may not be covered by your plan.  Please co within the next three months to obtain basic health screening including reassessment of your blood pressure.     IF THERE IS ANY CHANGE OR WORSENING OF YOUR CONDITION, CALL YOUR PRIMARY CARE PHYSICIAN AT ONCE OR RETURN IMMEDIATELY TO THE EMERGENCY DEPARTMEN

## (undated) NOTE — MR AVS SNAPSHOT
Alesia Sweeney   3/22/2017 11:45 AM   Office Visit   MRN:  [id number]    Description:  Male : 3/22/1924   Provider:  Schuyler Colin   Department:  [facility] Hematology Oncology              Visit Summary      Allergies     No Known All Wednesday March 29, 2017 9:30 AM     Appointment with Laine Montejo at [facility] ([phone])   You are scheduled for an annual health assessment. We look forward to seeing you.     Please bring all of your medication(s) t